# Patient Record
Sex: FEMALE | Race: WHITE
[De-identification: names, ages, dates, MRNs, and addresses within clinical notes are randomized per-mention and may not be internally consistent; named-entity substitution may affect disease eponyms.]

---

## 2019-04-16 ENCOUNTER — HOSPITAL ENCOUNTER (EMERGENCY)
Dept: HOSPITAL 60 - LB.ED | Age: 49
Discharge: HOME | End: 2019-04-16
Payer: COMMERCIAL

## 2019-04-16 DIAGNOSIS — N20.2: Primary | ICD-10-CM

## 2019-04-16 PROCEDURE — 99284 EMERGENCY DEPT VISIT MOD MDM: CPT

## 2019-04-16 PROCEDURE — 96372 THER/PROPH/DIAG INJ SC/IM: CPT

## 2019-04-16 PROCEDURE — 74176 CT ABD & PELVIS W/O CONTRAST: CPT

## 2019-04-16 PROCEDURE — 81001 URINALYSIS AUTO W/SCOPE: CPT

## 2019-04-16 NOTE — CT
DATE OF SERVICE:  04/16/19

CLINICAL DATA:  kidney stone



UNENHANCED ABDOMEN AND PELVIC CT:



Multislice acquisition through the abdomen and pelvis without IV or oral 
contrast was performed. 



No priors.  



The lung bases are clear.  



The liver is normal size with homogeneous attenuation.  No focal hepatic 
lesions.  The gallbladder appears normal.  No calcified gallstones. 



The spleen appears normal.  The pancreas appears normal.  The right and left 
adrenals appear normal.  There are small nonobstructing renal calculi 
bilaterally.  There is a 5 mm distal ureteral calculus on the left located in 
the distal left ureter just proximal to the ureterovesical junction.  There is 
hydronephrosis and hydroureter proximal to it consistent with obstruction.  The 
left kidney also appears to be edematous with minimal perinephric fat stranding 
most likely related to obstruction.  Pyelonephritis should be considered.  The 
kidneys and collecting systems are otherwise unremarkable.



There is a small amount of fluid within the bladder.  It appears grossly 
normal.  



The appendix is not dilated.  No evidence of appendicitis.  



There is diverticulosis of the colon.  No evidence of diverticulitis.  



There is a 4.2 cm fluid density lesion within the right ovary.  This may be 
multiple physiologic ovarian cysts.  A septated ovarian lesion including cystic 
ovarian neoplasm should at least be considered.  There is a small low density 
lesion in the left ovary also.  Followup ultrasound is recommended to further 
evaluate these.  



No free air.  No free fluid.  No dilated loops of bowel.  No adenopathy.  No 
aortic aneurysm.  



There is a small umbilical hernia containing fat.  



IMPRESSION:  

1.  A 5 mm distal ureteral calculus on left with obstruction.  

2.  Enlarged right ovary with fluid density lesions which may be multiple 
physiologic ovarian cysts or a septated cystic lesion.  Followup ultrasound is 
recommended to further evaluate and confirm resolution.  

3.  Other findings as discussed above.



[id number]
[facility]

## 2019-04-16 NOTE — EDM.PDOC
ED HPI GENERAL MEDICAL PROBLEM





- General


Chief Complaint: Flank Pain


Stated Complaint: KIDNEY PAIN LEFT SIDE


Time Seen by Provider: 19 13:00


Source of Information: Reports: Patient


History Limitations: Reports: No Limitations





- History of Present Illness


INITIAL COMMENTS - FREE TEXT/NARRATIVE: 





This patient presents to the ED for evaluation of left flank pain. She states 

she had some pain in the same a couple of weeks ago that went away without 

intervention. The pain started earlier today and is sharp and localized to the 

left flank area. She also has some pain in her left pelvic area. She denies 

dysuria, frequency or urgency. She has a history of kidney stones. She denies 

other concerns or complaints.


Onset: Today, Sudden


Onset Date: 19


Onset Time: 10:00


Duration: Getting Worse


Location: Reports: Upper Extremity, Left


Severity: Moderate


Improves with: Reports: None


Worsens with: Reports: None


Associated Symptoms: Reports: No Other Symptoms





ED ROS GENERAL





- Review of Systems


Review Of Systems: See Below


Constitutional: Denies: Fever


HEENT: Reports: No Symptoms


Respiratory: Denies: Shortness of Breath, Cough


Cardiovascular: Denies: Chest Pain


Endocrine: Reports: No Symptoms


GI/Abdominal: Reports: Decreased Appetite.  Denies: Abdominal Pain, Nausea, 

Vomiting


: Reports: Flank Pain.  Denies: Frequency, Hematuria, Urgency


Musculoskeletal: Reports: No Symptoms


Skin: Reports: No Symptoms


Neurological: Reports: No Symptoms





ED EXAM, RENAL/





- Physical Exam


Exam: See Below


Exam Limited By: No Limitations


General Appearance: Alert, WD/WN, Mild Distress


Eye Exam: Bilateral Eye: PERRL


Ears: Normal External Exam


Nose: Normal Inspection


Throat/Mouth: Normal Inspection


Head: Atraumatic, Normocephalic


Neck: Supple, Non-Tender, Full Range of Motion


Respiratory/Chest: No Respiratory Distress, Lungs Clear, Normal Breath Sounds, 

No Accessory Muscle Use


Cardiovascular: Regular Rate, Rhythm


GI/Abdominal: Normal Bowel Sounds, Soft, Non-Tender, No Organomegaly, Other (

mild left pelvic tenderness)


Neurological: Alert, Oriented


Psychiatric: Normal Affect, Normal Mood


Skin Exam: Warm, Dry, Intact





Course





- Orders/Labs/Meds


Orders: 


 Active Orders 24 hr











 Category Date Time Status


 


 Kidney Stone Protocol [CT] Stat Exams  19 14:16 Ordered











Labs: 


 Laboratory Tests











  19 Range/Units





  14:00 


 


Urine Color  Yellow  


 


Urine Appearance  Cloudy  (CLEAR)  


 


Urine pH  6.0  (5.0-8.0)  


 


Ur Specific Gravity  >= 1.030  (1.003-1.030)  


 


Urine Protein  100 H  (NEGATIVE)  mg/dL


 


Urine Glucose (UA)  Negative  (NEGATIVE)  mg/dL


 


Urine Ketones  Negative  (NEGATIVE)  mg/dL


 


Urine Occult Blood  Large H  (NEGATIVE)  


 


Urine Nitrite  Negative  (NEGATIVE)  


 


Urine Bilirubin  Negative  (NEGATIVE)  


 


Urine Urobilinogen  0.2  (0.2-1.0)  E.U./dL


 


Ur Leukocyte Esterase  Negative  (NEGATIVE)  


 


Urine RBC  >100 H  /HPF


 


Urine WBC  0-5 H  /HPF


 


Ur Squamous Epith Cells  Few  /HPF


 


Urine Bacteria  Few  /HPF











Meds: 


Medications














Discontinued Medications














Generic Name Dose Route Start Last Admin





  Trade Name Freq  PRN Reason Stop Dose Admin


 


Ketorolac Tromethamine  Confirm  19 13:15  19 13:10





  Toradol  Administered  19 13:16  60 mg





  Dose   Administration





  60 mg   





  .ROUTE   





  .STK-MED ONE   





     





     





     





     


 


Morphine Sulfate  5 mg  19 14:18  19 13:25





  Morphine  IM  19 14:19  5 mg





  ONETIME ONE   Administration





     





     





     





     














- Re-Assessments/Exams


Free Text/Narrative Re-Assessment/Exam: 





19 15:49


This patient presents with left flank pain. Differential Diagnosis considered 

included ureterolithiasis, UTI, pyelonephritis, appendicitis, colitis, GIB, 

diverticulitis, volvulus, ectopic, cyst, pregnancy, Signs and symptoms 

consistent with ureterolithiasis. This was confirmed on CT. Given the size of 

the stone, it is likely that the patient will be able to pass this. Patients 

pain is controlled in the ED and she was given flomax and vicodin for home use. 

There is no signs of infected stone. Tolerates PO. Patient is hemodynamically 

stable in the ED and plan is home with recheck by primary care physician in 2-3 

days if she is not better. She should return to the ED if symptoms worsen. 

Ureterolithiasis precautions for home. Questions were answered.





Departure





- Departure


Time of Disposition: 14:00


Disposition:  20


Condition: Good


Clinical Impression: 


 Kidney stone on left side








- Discharge Information


*PRESCRIPTION DRUG MONITORING PROGRAM REVIEWED*: No


*COPY OF PRESCRIPTION DRUG MONITORING REPORT IN PATIENT ANDREW: No


Instructions:  Acetaminophen; Hydrocodone tablets or capsules, Kidney Stones, 

Easy-to-Read, Tamsulosin capsules


Referrals: 


PCP,None [Primary Care Provider] - 


Forms:  ED Department Discharge


Care Plan Goals: 


flomax 0.4mg daily x 5 days, vicodin 5/325 one or two tablets every 6 to 8 

hours as needed for severe pain.





- My Orders


Last 24 Hours: 


My Active Orders





19 14:16


Kidney Stone Protocol [CT] Stat 














- Assessment/Plan


Last 24 Hours: 


My Active Orders





19 14:16


Kidney Stone Protocol [CT] Stat

## 2020-12-26 NOTE — EDM.PDOC
ED HPI GENERAL MEDICAL PROBLEM





- General


Chief Complaint: General


Stated Complaint: hematuria


Time Seen by Provider: 12/26/20 20:15


Source of Information: Reports: Patient


History Limitations: Reports: No Limitations





- History of Present Illness


INITIAL COMMENTS - FREE TEXT/NARRATIVE: 





left flank plan, h/o kidney stones.





patient presented to the ER with a 2 days h/o of left flank pain, non radiating.

No fever or chills. mild nausea but no emesis.





h/o kidney stones in the past. last was April/2019. passed with out 

intervention.





OTC meds didn't help with the pain.





she reports hematuria, frequency and decreased urine output today. 





Reports pain 8 out of 10


Onset: Sudden, Gradual


Duration: Day(s): (2)


Quality: Reports: Sharp


Severity: Severe


Improves with: Reports: None


Worsens with: Reports: None


Treatments PTA: Reports: Acetaminophen, NSAIDS





- Related Data


                                    Allergies











Allergy/AdvReac Type Severity Reaction Status Date / Time


 


latex Allergy  Rash Verified 04/16/19 17:05


 


Penicillins Allergy  Rash Verified 04/16/19 17:05











Home Meds: 


                                    Home Meds





NK [No Known Home Meds]  04/16/19 [History]











Past Medical History


HEENT History: Reports: None


Cardiovascular History: Reports: None


Respiratory History: Reports: None


Gastrointestinal History: Reports: None


Genitourinary History: Reports: Renal Calculus





ED ROS GENERAL





- Review of Systems


Review Of Systems: See Below


Constitutional: Reports: No Symptoms


HEENT: Reports: No Symptoms


Respiratory: Reports: No Symptoms


Cardiovascular: Reports: No Symptoms


GI/Abdominal: Reports: Abdominal Pain


: Reports: Flank Pain, Hematuria


Musculoskeletal: Reports: No Symptoms


Skin: Reports: No Symptoms


Neurological: Reports: No Symptoms





ED EXAM, GENERAL





- Physical Exam


Exam: See Below


Exam Limited By: No Limitations


General Appearance: Alert, WD/WN, No Apparent Distress


Throat/Mouth: Normal Inspection


Head: Atraumatic


Respiratory/Chest: No Respiratory Distress, Lungs Clear


Cardiovascular: Normal Peripheral Pulses


GI/Abdominal: Normal Bowel Sounds


Neurological: Alert, Oriented





Course





- Vital Signs


Last Recorded V/S: 


                                Last Vital Signs











Temp  34.4 C L  12/26/20 20:12


 


Pulse  92   12/26/20 20:12


 


Resp  20   12/26/20 20:12


 


BP  182/121 H  12/26/20 20:12


 


Pulse Ox      














- Orders/Labs/Meds


Orders: 


                               Active Orders 24 hr











 Category Date Time Status


 


 Ondansetron [Zofran ODT] Med  12/26/20 20:45 Active





 4 mg PO ONETIME PRN   








                                Medication Orders





Ondansetron HCl (Zofran Odt)  4 mg PO ONETIME PRN


   PRN Reason: Nausea








Labs: 


                                Laboratory Tests











  12/26/20 Range/Units





  20:30 


 


Urine Color  Red  


 


Urine Appearance  Slightly cloudy  (CLEAR)  


 


Urine RBC  >100 H  /HPF


 


Urine WBC  Not Reportable  


 


Ur Epithelial Cells  Few  /HPF


 


Urine Bacteria  Few  /HPF


 


Urinalysis Comment    











Meds: 


Medications











Generic Name Dose Route Start Last Admin





  Trade Name Freq  PRN Reason Stop Dose Admin


 


Ondansetron HCl  4 mg  12/26/20 20:45 





  Zofran Odt  PO  





  ONETIME PRN  





  Nausea  














Discontinued Medications














Generic Name Dose Route Start Last Admin





  Trade Name Freq  PRN Reason Stop Dose Admin


 


Ketorolac Tromethamine  60 mg  12/26/20 20:19  12/26/20 20:23





  Toradol  IM  12/26/20 20:20  60 mg





  ONETIME ONE   Administration














- Re-Assessments/Exams


Free Text/Narrative Re-Assessment/Exam: 





12/26/20 20:53


UA was obtained - ++ RBC.


12/26/20 21:02





IM Toradol 60mg was given - pain down from 8 to 4 - happy with the result





was d/c home on hydrocodone, Bactrim and Pyridium. 





Departure





- Departure


Time of Disposition: 21:04


Disposition: Home, Self-Care 01


Condition: Good


Clinical Impression: 


 Kidney stone on left side








- Discharge Information


*PRESCRIPTION DRUG MONITORING PROGRAM REVIEWED*: Not Applicable


*COPY OF PRESCRIPTION DRUG MONITORING REPORT IN PATIENT ANDREW: Not Applicable


Forms:  ED Department Discharge





Sepsis Event Note (ED)





- Focused Exam


Vital Signs: 


                                   Vital Signs











  Temp Pulse Resp BP


 


 12/26/20 20:12  34.4 C L  92  20  182/121 H














- Problem List & Annotations


(1) Kidney stone on left side


SNOMED Code(s): 61551079


   Code(s): N20.0 - CALCULUS OF KIDNEY   Status: Acute   Current Visit: Yes   





- My Orders


Last 24 Hours: 


My Active Orders





12/26/20 20:45


Ondansetron [Zofran ODT]   4 mg PO ONETIME PRN 














- Assessment/Plan


Last 24 Hours: 


My Active Orders





12/26/20 20:45


Ondansetron [Zofran ODT]   4 mg PO ONETIME PRN 











Plan: 





- increase fluids intake


- take pain medications and antibiotics as prescribed


- return to the ER if symptoms got worse or no urine output or fever


- follow up with the pcp in 3-5 days as needed